# Patient Record
Sex: FEMALE | Race: BLACK OR AFRICAN AMERICAN | NOT HISPANIC OR LATINO | Employment: STUDENT | RURAL
[De-identification: names, ages, dates, MRNs, and addresses within clinical notes are randomized per-mention and may not be internally consistent; named-entity substitution may affect disease eponyms.]

---
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
## 2024-05-20 ENCOUNTER — OFFICE VISIT (OUTPATIENT)
Dept: FAMILY MEDICINE | Facility: CLINIC | Age: 19
End: 2024-05-20

## 2024-05-20 VITALS
HEIGHT: 67 IN | WEIGHT: 186.13 LBS | SYSTOLIC BLOOD PRESSURE: 114 MMHG | HEART RATE: 90 BPM | DIASTOLIC BLOOD PRESSURE: 72 MMHG | BODY MASS INDEX: 29.21 KG/M2

## 2024-05-20 DIAGNOSIS — M79.644 PAIN IN FINGER OF RIGHT HAND: Primary | ICD-10-CM

## 2024-05-20 PROCEDURE — 99212 OFFICE O/P EST SF 10 MIN: CPT | Mod: ,,, | Performed by: NURSE PRACTITIONER

## 2024-05-20 NOTE — PROGRESS NOTES
"Subjective:       Patient ID: Ari Ascencio is a 18 y.o. female.    Chief Complaint: right middle finger pain (X 2weeks/Denies injury)    HPI  Patient presents c/o palpable, hard, knot to base of middle finger of right hand  Says that area is painful when she "pops" her knuckles or stretches out her fingers  No restrictions in active ROM of fingers of right hand  Denies any known injury  Denies any pain at present    /72   Pulse 90   Ht 5' 7" (1.702 m)   Wt 84.4 kg (186 lb 1.6 oz)   BMI 29.15 kg/m²     Medication List with Changes/Refills   Current Medications    FLUCONAZOLE (DIFLUCAN) 150 MG TAB    One tablet PO now and repeat in one week    METRONIDAZOLE (FLAGYL) 500 MG TABLET    Take 1 tablet (500 mg total) by mouth every 12 (twelve) hours.    PHENYLEPHRINE-DM-GUAIFENESIN (DECONEX DMX) 10-17.5-400 MG TAB    Take 1 tablet by mouth every 6 (six) hours as needed (cough and congestion).       Review of Systems   Respiratory:  Negative for cough.    Cardiovascular:  Negative for chest pain.   Musculoskeletal:  Positive for arthralgias (right hand, middle finger). Negative for joint swelling.         Objective:      Physical Exam  Vitals and nursing note reviewed.   Constitutional:       General: She is not in acute distress.     Appearance: Normal appearance.   HENT:      Head: Normocephalic.   Eyes:      Conjunctiva/sclera: Conjunctivae normal.   Neck:      Trachea: Trachea normal.   Cardiovascular:      Rate and Rhythm: Normal rate and regular rhythm.      Pulses: Normal pulses.      Heart sounds: Normal heart sounds.   Pulmonary:      Effort: Pulmonary effort is normal.      Breath sounds: Normal breath sounds.   Musculoskeletal:         General: No swelling, tenderness or signs of injury. Normal range of motion.      Cervical back: Neck supple.   Skin:     General: Skin is warm and dry.   Neurological:      General: No focal deficit present.      Mental Status: She is alert and oriented to person, " place, and time.   Psychiatric:         Mood and Affect: Mood normal.         Behavior: Behavior normal.         Assessment:       1. Pain in finger of right hand        Plan:       Patient Instructions   Xray in clinic today  Will call with results when available: [phone]    1142AM; Called and discussed today's negative imaging results with patient. Encouraged to follow up if area grew in size or pain becomes more regular or worsens. She voiced understanding   Pain in finger of right hand  -     X-Ray Finger 2 or More Views Right; Future; Expected date: 05/20/2024

## 2024-05-20 NOTE — PATIENT INSTRUCTIONS
Xray in clinic today  Will call with results when available: [phone]    1142AM; Called and discussed today's negative imaging results with patient. Encouraged to follow up if area grew in size or pain becomes more regular or worsens. She voiced understanding